# Patient Record
Sex: MALE | Race: WHITE | NOT HISPANIC OR LATINO | Employment: UNEMPLOYED | URBAN - NONMETROPOLITAN AREA
[De-identification: names, ages, dates, MRNs, and addresses within clinical notes are randomized per-mention and may not be internally consistent; named-entity substitution may affect disease eponyms.]

---

## 2019-09-19 ENCOUNTER — TELEPHONE (OUTPATIENT)
Dept: FAMILY MEDICINE | Facility: OTHER | Age: 39
End: 2019-09-19

## 2019-12-30 DIAGNOSIS — R37 SEXUAL DYSFUNCTION: Primary | ICD-10-CM

## 2019-12-30 RX ORDER — FLUOXETINE 10 MG/1
10 CAPSULE ORAL DAILY
Qty: 30 CAPSULE | Refills: 11 | Status: SHIPPED | OUTPATIENT
Start: 2019-12-30 | End: 2020-04-21

## 2020-04-21 DIAGNOSIS — N52.9 ERECTILE DYSFUNCTION, UNSPECIFIED ERECTILE DYSFUNCTION TYPE: Primary | ICD-10-CM

## 2020-04-21 DIAGNOSIS — R37 SEXUAL DYSFUNCTION: ICD-10-CM

## 2020-04-21 RX ORDER — SILDENAFIL CITRATE 20 MG/1
TABLET ORAL
Qty: 30 TABLET | Refills: 11 | Status: SHIPPED | OUTPATIENT
Start: 2020-04-21

## 2020-04-21 NOTE — PROGRESS NOTES
Patient has been on fluoxetine for premature ejaculation. It is helpful but he wonders about increasing from 10 up to 20 mg.  He has also had trouble with losing an erection and wondered about a trial of sildenafil.    Will increase Prozac to 20 mg daily.  Trial of sildenafil and instructed on use.  Discussed potential side effects.  Advised that his insurance may not cover this and he may need to pay cash for it.  He will keep in touch with his progress.  Genaro Spencer MD on 4/21/2020 at 1:58 PM